# Patient Record
Sex: FEMALE | URBAN - METROPOLITAN AREA
[De-identification: names, ages, dates, MRNs, and addresses within clinical notes are randomized per-mention and may not be internally consistent; named-entity substitution may affect disease eponyms.]

---

## 2023-12-21 ENCOUNTER — TELEPHONE (OUTPATIENT)
Dept: OBGYN CLINIC | Facility: CLINIC | Age: 33
End: 2023-12-21

## 2023-12-21 NOTE — TELEPHONE ENCOUNTER
Janet Zhang   : 1990 Sex: F MRN: [id number]  PCP: Akil Ugalde [facility] Created by: [employer] on Aug 03, 2023  GYN History   LMP: 2023  Menses is absent due to senia IUD  senia IUD (2019 12:51:23 PM)  History of STD: No  Hx of abnormal pap: No  Abnormal Pap Comments: n  Menses monthly: No  Period last 5 days. Comments: see HPI  Heavy menses: No  Painful menses: No  Menses Comments: menses once every 6 mos (IUD)  Sexual Activity   Sexual Activity: Yes   male partner, monogomous  Contraception    Past Contraception: None   Current Contraception: None  Comments: senia IUD inserted 2021    This writer obtained above from [place]. Pt wanted to know when she got her senia inserted. Pt informed with above information and VU.